# Patient Record
Sex: FEMALE | Race: BLACK OR AFRICAN AMERICAN | NOT HISPANIC OR LATINO | Employment: OTHER | URBAN - METROPOLITAN AREA
[De-identification: names, ages, dates, MRNs, and addresses within clinical notes are randomized per-mention and may not be internally consistent; named-entity substitution may affect disease eponyms.]

---

## 2018-02-15 ENCOUNTER — OFFICE VISIT (OUTPATIENT)
Dept: URGENT CARE | Facility: CLINIC | Age: 68
End: 2018-02-15
Payer: MEDICARE

## 2018-02-15 ENCOUNTER — HOSPITAL ENCOUNTER (EMERGENCY)
Facility: HOSPITAL | Age: 68
Discharge: HOME | End: 2018-02-16
Attending: EMERGENCY MEDICINE
Payer: MEDICARE

## 2018-02-15 VITALS
DIASTOLIC BLOOD PRESSURE: 90 MMHG | HEIGHT: 62 IN | SYSTOLIC BLOOD PRESSURE: 136 MMHG | RESPIRATION RATE: 18 BRPM | WEIGHT: 293 LBS | OXYGEN SATURATION: 99 % | BODY MASS INDEX: 53.92 KG/M2 | HEART RATE: 72 BPM

## 2018-02-15 DIAGNOSIS — R42 DIZZINESS: ICD-10-CM

## 2018-02-15 DIAGNOSIS — R51.9 ACUTE NONINTRACTABLE HEADACHE, UNSPECIFIED HEADACHE TYPE: ICD-10-CM

## 2018-02-15 DIAGNOSIS — R47.89 EPISODE OF CHANGE IN SPEECH: Primary | ICD-10-CM

## 2018-02-15 DIAGNOSIS — G45.9 TRANSIENT CEREBRAL ISCHEMIA, UNSPECIFIED TYPE: Primary | ICD-10-CM

## 2018-02-15 DIAGNOSIS — I63.9 STROKE: ICD-10-CM

## 2018-02-15 DIAGNOSIS — Z76.89 ENCOUNTER TO ESTABLISH CARE: ICD-10-CM

## 2018-02-15 DIAGNOSIS — N18.9 CHRONIC KIDNEY DISEASE, UNSPECIFIED CKD STAGE: ICD-10-CM

## 2018-02-15 DIAGNOSIS — I48.0 PAROXYSMAL ATRIAL FIBRILLATION: ICD-10-CM

## 2018-02-15 DIAGNOSIS — Z91.148 NON COMPLIANCE W MEDICATION REGIMEN: ICD-10-CM

## 2018-02-15 DIAGNOSIS — R47.01 EXPRESSIVE APHASIA: ICD-10-CM

## 2018-02-15 LAB
ALBUMIN SERPL BCP-MCNC: 3.9 G/DL
ALP SERPL-CCNC: 103 U/L
ALT SERPL W/O P-5'-P-CCNC: 11 U/L
ANION GAP SERPL CALC-SCNC: 12 MMOL/L
AST SERPL-CCNC: 12 U/L
BASOPHILS # BLD AUTO: 0.01 K/UL
BASOPHILS NFR BLD: 0.2 %
BILIRUB SERPL-MCNC: 1.3 MG/DL
BUN SERPL-MCNC: 44 MG/DL
CALCIUM SERPL-MCNC: 10 MG/DL
CHLORIDE SERPL-SCNC: 102 MMOL/L
CO2 SERPL-SCNC: 26 MMOL/L
CREAT SERPL-MCNC: 1.9 MG/DL
DIFFERENTIAL METHOD: ABNORMAL
EOSINOPHIL # BLD AUTO: 0.1 K/UL
EOSINOPHIL NFR BLD: 1.2 %
ERYTHROCYTE [DISTWIDTH] IN BLOOD BY AUTOMATED COUNT: 14 %
EST. GFR  (AFRICAN AMERICAN): 31 ML/MIN/1.73 M^2
EST. GFR  (NON AFRICAN AMERICAN): 27 ML/MIN/1.73 M^2
GLUCOSE SERPL-MCNC: 161 MG/DL
GLUCOSE SERPL-MCNC: 97 MG/DL (ref 70–110)
HCT VFR BLD AUTO: 40.8 %
HGB BLD-MCNC: 13.8 G/DL
INR PPP: 1
LYMPHOCYTES # BLD AUTO: 1.9 K/UL
LYMPHOCYTES NFR BLD: 31.1 %
MCH RBC QN AUTO: 31.5 PG
MCHC RBC AUTO-ENTMCNC: 33.8 G/DL
MCV RBC AUTO: 93 FL
MONOCYTES # BLD AUTO: 0.5 K/UL
MONOCYTES NFR BLD: 7.5 %
NEUTROPHILS # BLD AUTO: 3.6 K/UL
NEUTROPHILS NFR BLD: 60 %
PLATELET # BLD AUTO: 277 K/UL
PMV BLD AUTO: 10.4 FL
POTASSIUM SERPL-SCNC: 4 MMOL/L
PROT SERPL-MCNC: 8.2 G/DL
PROTHROMBIN TIME: 10.7 SEC
RBC # BLD AUTO: 4.38 M/UL
SODIUM SERPL-SCNC: 140 MMOL/L
TSH SERPL DL<=0.005 MIU/L-ACNC: 0.99 UIU/ML
WBC # BLD AUTO: 5.99 K/UL

## 2018-02-15 PROCEDURE — 99999 PR PBB SHADOW E&M-NEW PATIENT-LVL IV: CPT | Mod: PBBFAC,,, | Performed by: NURSE PRACTITIONER

## 2018-02-15 PROCEDURE — 93010 ELECTROCARDIOGRAM REPORT: CPT | Mod: ,,, | Performed by: INTERNAL MEDICINE

## 2018-02-15 PROCEDURE — 82948 REAGENT STRIP/BLOOD GLUCOSE: CPT | Mod: S$GLB,,, | Performed by: NURSE PRACTITIONER

## 2018-02-15 PROCEDURE — 99284 EMERGENCY DEPT VISIT MOD MDM: CPT | Mod: 25

## 2018-02-15 PROCEDURE — 84443 ASSAY THYROID STIM HORMONE: CPT

## 2018-02-15 PROCEDURE — 99214 OFFICE O/P EST MOD 30 MIN: CPT | Mod: S$GLB,,, | Performed by: NURSE PRACTITIONER

## 2018-02-15 PROCEDURE — 3008F BODY MASS INDEX DOCD: CPT | Mod: S$GLB,,, | Performed by: NURSE PRACTITIONER

## 2018-02-15 PROCEDURE — 93005 ELECTROCARDIOGRAM TRACING: CPT

## 2018-02-15 PROCEDURE — 1159F MED LIST DOCD IN RCRD: CPT | Mod: S$GLB,,, | Performed by: NURSE PRACTITIONER

## 2018-02-15 PROCEDURE — 85025 COMPLETE CBC W/AUTO DIFF WBC: CPT

## 2018-02-15 PROCEDURE — 80053 COMPREHEN METABOLIC PANEL: CPT

## 2018-02-15 PROCEDURE — 85610 PROTHROMBIN TIME: CPT

## 2018-02-15 PROCEDURE — 1125F AMNT PAIN NOTED PAIN PRSNT: CPT | Mod: S$GLB,,, | Performed by: NURSE PRACTITIONER

## 2018-02-15 RX ORDER — BUMETANIDE 1 MG/1
1 TABLET ORAL DAILY
COMMUNITY

## 2018-02-15 RX ORDER — POTASSIUM CHLORIDE 750 MG/1
10 CAPSULE, EXTENDED RELEASE ORAL ONCE
COMMUNITY

## 2018-02-15 RX ORDER — CARVEDILOL 12.5 MG/1
12.5 TABLET ORAL 2 TIMES DAILY WITH MEALS
COMMUNITY

## 2018-02-15 RX ORDER — SPIRONOLACTONE 50 MG/1
50 TABLET, FILM COATED ORAL DAILY
COMMUNITY

## 2018-02-15 NOTE — PROGRESS NOTES
"Subjective:      Patient ID: Em Kimbrough is a 68 y.o. female.    Chief Complaint: Dizziness and Headache    Mrs. Kimbrough presents to Urgent Care today with complaints of headache, dizziness, and change in speech. Symptoms started last night and the speech difficulty happened again today just prior to checking in to Urgent Care. She admits that she was previously on Eliquis but discontinued this medication about one year ago after she stopped seeing her cardiologist.       Dizziness:   Chronicity:  New  Onset:  Today  Progression since onset:  Gradually improving  Frequency:  Constantly  Severity:  Initially severe, but improved   Associated symptoms: headaches (started last night), nausea, vomiting (x 1 episode last night) and visual disturbances (last night had some "dullness" to the vision).no facial weakness.Slurred speech: had about a minute of "speaking jibberish" about 30 minutes ago and a similar episode last night.  Aggravated by:  Nothing   history of CHF; was on Plavix but discontinued this medication about one year ago  Headache    Associated symptoms include dizziness, nausea and vomiting (x 1 episode last night). (History of CHF; was on Plavix but discontinued this medication about one year ago)     Review of Systems   Gastrointestinal: Positive for nausea and vomiting (x 1 episode last night).   Neurological: Positive for dizziness, speech difficulty (resolved) and headaches (started last night).   Hematological: Negative.    Psychiatric/Behavioral: Negative.        Objective:   BP (!) 136/90   Pulse 72   Resp 18   Ht 5' 2" (1.575 m)   Wt 135.2 kg (298 lb)   SpO2 99%   BMI 54.50 kg/m²   Physical Exam   Constitutional: She is oriented to person, place, and time. She appears well-developed and well-nourished. No distress.   Full exam deferred to expedite getting Mrs. Kimbrough to the ER.   HENT:   Head: Normocephalic and atraumatic.   Eyes: Conjunctivae and EOM are normal. Pupils are equal, round, and " reactive to light.   Neck: Normal range of motion. Neck supple.   Cardiovascular: Normal rate, regular rhythm and normal heart sounds.    Pulmonary/Chest: Effort normal and breath sounds normal. No respiratory distress.   Neurological: She is alert and oriented to person, place, and time. She has normal strength. No sensory deficit. Coordination and gait normal. GCS eye subscore is 4. GCS verbal subscore is 5. GCS motor subscore is 6.   Skin: Skin is warm and dry. She is not diaphoretic.   Nursing note and vitals reviewed.    Assessment:      1. Episode of change in speech    2. Acute nonintractable headache, unspecified headache type    3. Dizziness    4. Encounter to establish care       Plan:   Episode of change in speech  Comments:  To ER for further workup and evaluation.  Orders:  -     POCT Glucose    Acute nonintractable headache, unspecified headache type  -     POCT Glucose    Dizziness  -     POCT Glucose    Encounter to establish care  -     Ambulatory referral to Internal Medicine    Advised ER for immediate eval and workup. Mrs. Kimbrough declined ambulance transport. We discussed potential for delay in diagnosis or worsening condition (potentially life threatening declines in condition). She is aware and will have her  drive her by private vehicle. Requests [facility]. Will call for report.  Instructions, follow up, and supportive care as per AVS.

## 2018-02-16 VITALS
HEIGHT: 62 IN | SYSTOLIC BLOOD PRESSURE: 138 MMHG | WEIGHT: 293 LBS | HEART RATE: 60 BPM | TEMPERATURE: 97 F | OXYGEN SATURATION: 99 % | BODY MASS INDEX: 53.92 KG/M2 | RESPIRATION RATE: 21 BRPM | DIASTOLIC BLOOD PRESSURE: 61 MMHG

## 2018-02-16 NOTE — ED NOTES
"Pt states that last December and again today pt had brief moments of not being able to speak legibly. Pt states episode was " a couple of minutes in December but today it lasted for only a few seconds". This happened around 5pm and pt had not eaten since breakfast. Associated symptoms were slight dizziness.   "

## 2018-02-16 NOTE — ED PROVIDER NOTES
"SCRIBE #1 NOTE: I, Alicia Shona, am scribing for, and in the presence of, Joseph Tripp MD. I have scribed the entire note.      History      Chief Complaint   Patient presents with    Change in speech     "I couldnt speak English, it was jumbled." x 2 episodes earlier today. seen at urgent care.     Dizziness     onset last night. +N/V and blurred vision       Review of patient's allergies indicates:   Allergen Reactions    Nifedipine Swelling        HPI   HPI    2/15/2018, 9:16 PM   History obtained from the patient      History of Present Illness: Em Kimbrough is a 68 y.o. female patient with a PMHx of HTN who presents to the Emergency Department for difficulty speaking which onset suddenly at 1700. Pt states she was unable to speak "correctly" for "a few seconds". Pt's  states pt was "talking gibberish". Symptoms have resolved and were moderate in severity. No mitigating or exacerbating factors reported. Associated sxs include dizziness. Patient denies any fever, chills, head trauma, CP, palpitations, HA, extremity weakness/numbness, facial droop, n/v, and all other sxs at this time. Pt states she had an episode similar to this one in December 2017 which lasted approximately 15 minutes. Pt reports she has been diagnosed with Afib before and was taking Eliquis but took herself off of the medications because she thought she didn't need them anymore. No further complaints or concerns at this time.       Arrival mode: Personal vehicle     PCP: Primary Doctor No       Past Medical History:  Past Medical History:   Diagnosis Date    Hypertension        Past Surgical History:  Past Surgical History:   Procedure Laterality Date    HYSTERECTOMY           Family History:  History reviewed. No pertinent family history.    Social History:  Social History     Social History Main Topics    Smoking status: Never Smoker    Smokeless tobacco: Never Used    Alcohol use No    Drug use: No    Sexual activity: No "       ROS   Review of Systems   Constitutional: Negative for chills and fever.   HENT: Negative for sore throat.    Respiratory: Negative for shortness of breath.    Cardiovascular: Negative for chest pain and palpitations.   Gastrointestinal: Negative for nausea and vomiting.   Genitourinary: Negative for dysuria.   Musculoskeletal: Negative for back pain.   Skin: Negative for rash.   Neurological: Positive for dizziness and speech difficulty. Negative for facial asymmetry, weakness, numbness and headaches.        (-) Head trauma   Hematological: Does not bruise/bleed easily.   All other systems reviewed and are negative.    Physical Exam      Initial Vitals [02/15/18 2047]   BP Pulse Resp Temp SpO2   (!) 140/70 66 18 97.4 °F (36.3 °C) 96 %      MAP       93.33          Physical Exam  Nursing Notes and Vital Signs Reviewed.  Constitutional: Patient is in no acute distress. Well-developed and well-nourished.  Head: Atraumatic. Normocephalic.  Eyes: PERRL. EOM intact. Conjunctivae are not pale. No scleral icterus.  ENT: Mucous membranes are moist. Oropharynx is clear and symmetric.    Neck: Supple. Full ROM. No lymphadenopathy.  Cardiovascular: Regular rate. Irregularly irregular rhythm. No murmurs, rubs, or gallops. Distal pulses are 2+ and symmetric.  Pulmonary/Chest: No respiratory distress. Clear to auscultation bilaterally. No wheezing or rales.  Abdominal: Soft and non-distended.  There is no tenderness.  No rebound, guarding, or rigidity.   Musculoskeletal: Moves all extremities. No obvious deformities. No edema. No calf tenderness.  Skin: Warm and dry.  Neurological: Patient is alert and oriented to person, place and time. Pupils ERRL and EOM normal. Cranial nerves II-XII are intact. Strength is full bilaterally; it is equal and 5/5 in bilateral upper and lower extremities. There is no pronator drift of outstretched arms. Light touch sense is intact. No facial droop. Speech is clear and normal. No acute focal  "neurological deficits noted.  Psychiatric: Normal affect. Good eye contact. Appropriate in content.    ED Course    Procedures  ED Vital Signs:  Vitals:    02/15/18 2047   BP: (!) 140/70   Pulse: 66   Resp: 18   Temp: 97.4 °F (36.3 °C)   TempSrc: Oral   SpO2: 96%   Weight: 135.2 kg (298 lb)   Height: 5' 2" (1.575 m)       Abnormal Lab Results:  Labs Reviewed   CBC W/ AUTO DIFFERENTIAL - Abnormal; Notable for the following:        Result Value    MCH 31.5 (*)     All other components within normal limits   COMPREHENSIVE METABOLIC PANEL - Abnormal; Notable for the following:     Glucose 161 (*)     BUN, Bld 44 (*)     Creatinine 1.9 (*)     Total Bilirubin 1.3 (*)     eGFR if  31 (*)     eGFR if non  27 (*)     All other components within normal limits   PROTIME-INR   TSH        All Lab Results:  Results for orders placed or performed during the hospital encounter of 02/15/18   CBC W/ AUTO DIFFERENTIAL   Result Value Ref Range    WBC 5.99 3.90 - 12.70 K/uL    RBC 4.38 4.00 - 5.40 M/uL    Hemoglobin 13.8 12.0 - 16.0 g/dL    Hematocrit 40.8 37.0 - 48.5 %    MCV 93 82 - 98 fL    MCH 31.5 (H) 27.0 - 31.0 pg    MCHC 33.8 32.0 - 36.0 g/dL    RDW 14.0 11.5 - 14.5 %    Platelets 277 150 - 350 K/uL    MPV 10.4 9.2 - 12.9 fL    Gran # (ANC) 3.6 1.8 - 7.7 K/uL    Lymph # 1.9 1.0 - 4.8 K/uL    Mono # 0.5 0.3 - 1.0 K/uL    Eos # 0.1 0.0 - 0.5 K/uL    Baso # 0.01 0.00 - 0.20 K/uL    Gran% 60.0 38.0 - 73.0 %    Lymph% 31.1 18.0 - 48.0 %    Mono% 7.5 4.0 - 15.0 %    Eosinophil% 1.2 0.0 - 8.0 %    Basophil% 0.2 0.0 - 1.9 %    Differential Method Automated    Comprehensive metabolic panel   Result Value Ref Range    Sodium 140 136 - 145 mmol/L    Potassium 4.0 3.5 - 5.1 mmol/L    Chloride 102 95 - 110 mmol/L    CO2 26 23 - 29 mmol/L    Glucose 161 (H) 70 - 110 mg/dL    BUN, Bld 44 (H) 8 - 23 mg/dL    Creatinine 1.9 (H) 0.5 - 1.4 mg/dL    Calcium 10.0 8.7 - 10.5 mg/dL    Total Protein 8.2 6.0 - 8.4 g/dL "    Albumin 3.9 3.5 - 5.2 g/dL    Total Bilirubin 1.3 (H) 0.1 - 1.0 mg/dL    Alkaline Phosphatase 103 55 - 135 U/L    AST 12 10 - 40 U/L    ALT 11 10 - 44 U/L    Anion Gap 12 8 - 16 mmol/L    eGFR if African American 31 (A) >60 mL/min/1.73 m^2    eGFR if non African American 27 (A) >60 mL/min/1.73 m^2   Protime-INR   Result Value Ref Range    Prothrombin Time 10.7 9.0 - 12.5 sec    INR 1.0 0.8 - 1.2   TSH   Result Value Ref Range    TSH 0.987 0.400 - 4.000 uIU/mL       Imaging Results:  Imaging Results          X-Ray Chest AP Portable (Final result)  Result time 02/15/18 22:30:44    Final result by Nathen Kelly MD (02/15/18 22:30:44)                 Impression:     Cardiomegaly. No acute process noted.      Electronically signed by: NATHEN KELLY MD  Date:     02/15/18  Time:    22:30              Narrative:    Exam: Chest X-ray, one view.    History: CVA, unspecified.    Findings: No infiltrate or effusion identified. Mild cardiomegaly.                             CT Head Without Contrast (Final result)  Result time 02/15/18 22:21:42    Final result by Nathen Kelly MD (02/15/18 22:21:42)                 Impression:         Unremarkable exam.    All CT scans at this facility use dose modulation, iterative reconstruction, and/or weight-based dosing when appropriate to reduce radiation dose to as low as reasonably achievable.      Electronically signed by: NATHEN KELLY MD  Date:     02/15/18  Time:    22:21              Narrative:    Exam: CT head without contrast.    History: Neuro deficit(s), subacute. Transient cerebral ischemic attack, unspecified.     Findings: Ventricular/sulcal pattern is normal. No acute intracranial process noted. No hemorrhage or mass displacement. Degenerative calcifications are noted in the basal ganglia bilaterally. Visualized paranasal sinuses and mastoids are clear.                             The EKG was ordered, reviewed, and independently interpreted by the ED  provider.  Interpretation time: 21:09  Rate: 76 BPM  Rhythm: atrial fibrillation  Interpretation: Septal infarct. Possible lateral infarct. No STEMI.         The Emergency Provider reviewed the vital signs and test results, which are outlined above.    ED Discussion     11:52 PM: Reassessed pt at this time. Discussed need for admission for further evaluation with pt. Pt declined admission at this time. Pt is AAOx4, in NAD, and VSS. Discussed with pt all pertinent ED information and results. Discussed pt dx and plan of tx. Gave pt all f/u and return to the ED instructions. All questions and concerns were addressed at this time. Pt expresses understanding of information and instructions, and is comfortable with plan to discharge. Pt is stable for discharge.    I discussed with patient and/or family/caretaker that evaluation in the ED does not suggest any emergent or life threatening medical conditions requiring immediate intervention beyond what was provided in the ED, and I believe patient is safe for discharge.  Regardless, an unremarkable evaluation in the ED does not preclude the development or presence of a serious of life threatening condition. As such, patient was instructed to return immediately for any worsening or change in current symptoms.      ED Medication(s):  Medications - No data to display    New Prescriptions    APIXABAN 5 MG TAB    Take 1 tablet (5 mg total) by mouth 2 (two) times daily.             Medical Decision Making    Medical Decision Making:   Clinical Tests:   Lab Tests: Ordered and Reviewed  Radiological Study: Reviewed and Ordered  Medical Tests: Reviewed and Ordered           Scribe Attestation:   Scribe #1: I performed the above scribed service and the documentation accurately describes the services I performed. I attest to the accuracy of the note.    Attending:   Physician Attestation Statement for Scribe #1: I, Joseph Tripp MD, personally performed the services described in this  documentation, as scribed by Alicia Nagel, in my presence, and it is both accurate and complete.          Clinical Impression       ICD-10-CM ICD-9-CM   1. Transient cerebral ischemia, unspecified type G45.9 435.9   2. Dizziness R42 780.4   3. Stroke I63.9 434.91   4. Expressive aphasia R47.01 784.3   5. Paroxysmal atrial fibrillation I48.0 427.31   6. Non compliance w medication regimen Z91.14 V15.81   7. Chronic kidney disease, unspecified CKD stage N18.9 585.9       Disposition:   Disposition: Discharged  Condition: Stable         Joseph Tripp MD  02/15/18 [id number]

## 2018-02-16 NOTE — PROGRESS NOTES
Patient, Em Kimbrough (MRN #[id number]), presented with a recorded BMI of 54.5 kg/m^2 consistent with the definition of morbid obesity (ICD-10 E66.01). The patient's morbid obesity was monitored, evaluated, addressed and/or treated. This addendum to the medical record is made on 02/16/2018.